# Patient Record
Sex: MALE | Race: WHITE | Employment: FULL TIME | URBAN - METROPOLITAN AREA
[De-identification: names, ages, dates, MRNs, and addresses within clinical notes are randomized per-mention and may not be internally consistent; named-entity substitution may affect disease eponyms.]

---

## 2017-10-14 NOTE — ED NOTES
[place], drink, cup of water, warm blanket, and TV remote given to pt per request. Pt is calm and cooperative. Will continue to monitor pt.

## 2017-10-14 NOTE — ED INITIAL ASSESSMENT (HPI)
32YM c/c of rad DE JESUS Pt state he was recently released from [address] [facility] state he was told be his doc to come in for increased in thought of wanting to harm himself.  Pt state he has a plan to hang himself again
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
## 2017-10-14 NOTE — ED PROVIDER NOTES
Patient Seen in: [facility] Emergency Department    History   Patient presents with:  Eval-P (psychiatric)    Stated Complaint: eval p suicidal just discharged from psych facility    HPI    Patient is a 57-year-old male who presents for evaluation o pallor  Neuro: Awake oriented ×3. Flat affect.   Nonfocal.  Good strength throughout    ED Course     Labs Reviewed   CBC W/ DIFFERENTIAL - Abnormal; Notable for the following:        Result Value    HGB 17.7 (*)     RDW-SD 48.9 (*)     Monocyte Absolute 0

## 2018-06-06 ENCOUNTER — HOSPITAL ENCOUNTER (EMERGENCY)
Facility: HOSPITAL | Age: 33
Discharge: HOME | End: 2018-06-06
Attending: EMERGENCY MEDICINE
Payer: MEDICAID

## 2018-06-06 ENCOUNTER — APPOINTMENT (OUTPATIENT)
Dept: CT IMAGING | Facility: HOSPITAL | Age: 33
End: 2018-06-06
Attending: EMERGENCY MEDICINE
Payer: MEDICAID

## 2018-06-06 ENCOUNTER — APPOINTMENT (OUTPATIENT)
Dept: GENERAL RADIOLOGY | Facility: HOSPITAL | Age: 33
End: 2018-06-06
Payer: MEDICAID

## 2018-06-06 VITALS
HEART RATE: 72 BPM | OXYGEN SATURATION: 100 % | TEMPERATURE: 98 F | HEIGHT: 74 IN | RESPIRATION RATE: 16 BRPM | BODY MASS INDEX: 37.6 KG/M2 | WEIGHT: 293 LBS | DIASTOLIC BLOOD PRESSURE: 80 MMHG | SYSTOLIC BLOOD PRESSURE: 126 MMHG

## 2018-06-06 DIAGNOSIS — R74.8 ELEVATED LIVER ENZYMES: ICD-10-CM

## 2018-06-06 DIAGNOSIS — R07.9 CHEST PAIN OF UNCERTAIN ETIOLOGY: Primary | ICD-10-CM

## 2018-06-06 PROCEDURE — 71045 X-RAY EXAM CHEST 1 VIEW: CPT | Performed by: EMERGENCY MEDICINE

## 2018-06-06 PROCEDURE — 93010 ELECTROCARDIOGRAM REPORT: CPT

## 2018-06-06 PROCEDURE — 96374 THER/PROPH/DIAG INJ IV PUSH: CPT

## 2018-06-06 PROCEDURE — 84484 ASSAY OF TROPONIN QUANT: CPT | Performed by: EMERGENCY MEDICINE

## 2018-06-06 PROCEDURE — 85025 COMPLETE CBC W/AUTO DIFF WBC: CPT

## 2018-06-06 PROCEDURE — 71275 CT ANGIOGRAPHY CHEST: CPT | Performed by: EMERGENCY MEDICINE

## 2018-06-06 PROCEDURE — 83690 ASSAY OF LIPASE: CPT | Performed by: HOSPITALIST

## 2018-06-06 PROCEDURE — 99285 EMERGENCY DEPT VISIT HI MDM: CPT

## 2018-06-06 PROCEDURE — 84484 ASSAY OF TROPONIN QUANT: CPT

## 2018-06-06 PROCEDURE — 80053 COMPREHEN METABOLIC PANEL: CPT

## 2018-06-06 PROCEDURE — 93005 ELECTROCARDIOGRAM TRACING: CPT

## 2018-06-06 PROCEDURE — 80053 COMPREHEN METABOLIC PANEL: CPT | Performed by: EMERGENCY MEDICINE

## 2018-06-06 PROCEDURE — 85025 COMPLETE CBC W/AUTO DIFF WBC: CPT | Performed by: EMERGENCY MEDICINE

## 2018-06-06 RX ORDER — ARIPIPRAZOLE 5 MG/1
5 TABLET ORAL DAILY
COMMUNITY

## 2018-06-06 RX ORDER — LORAZEPAM 2 MG/ML
0.5 INJECTION INTRAMUSCULAR ONCE
Status: COMPLETED | OUTPATIENT
Start: 2018-06-06 | End: 2018-06-06

## 2018-06-06 NOTE — ED INITIAL ASSESSMENT (HPI)
Pt to ED with c/o CP x couple days. Sts pain increasing with exertion. Pt sts increasing SOB over last couple days. Denies recent travel.

## 2018-06-06 NOTE — ED PROVIDER NOTES
Patient Seen in: [facility] Emergency Department    History   Patient presents with:  Chest Pain Angina (cardiovascular)    Stated Complaint: CP    HPI    50-year-old male, here for evaluation of chest pain.   He has been having a chest tightness marshall oriented to person, place, and time. Appears well-developed and well-nourished. He is obese  Head: Normocephalic and atraumatic. Nose: Nose normal.   Eyes: EOM are normal. Pupils are equal, round, and reactive to light. Neck: Normal range of motion.  Juana Cage axes as noted on EKG Report. Rate: 83  Rhythm: Sinus Rhythm  Reading: No significant changes from EKG done earlier in the day.                  ED Course as of Jun 06 1707  ------------------------------------------------------------      [facility]       Summary enzymes    Disposition:  Discharge  6/6/2018  5:06 pm    Follow-up:  Tj Moser MD  [address] [facility] [address] [place] [address]    In 1 week      Sherrie Perez MD  [address] [place], [place] [id number]

## 2018-06-06 NOTE — ED NOTES
Round on pt from CT. No distress noted. Pt requests anxiety med. MD updated. No distress noted.  Lights dimmed in room

## 2018-06-06 NOTE — ED NOTES
Round on pt. No distress noted. Family at bedside. Pt denies any needs at this time.  Updated on eta to CT

## (undated) NOTE — ED AVS SNAPSHOT
Violet Valdivia   MRN: [id number]    Department:  [facility] Emergency Department   Date of Visit:  10/13/2017           Disclosure     Insurance plans vary and the physician(s) referred by the ER may not be covered by your plan.  Please contact If you have been prescribed any medication(s), please fill your prescription right away and begin taking the medication(s) as directed    If the emergency physician has read X-rays, these will be re-interpreted by a radiologist.  If there is a significant

## (undated) NOTE — ED AVS SNAPSHOT
Johanna Marie   MRN: [id number]    Department:  [facility] Emergency Department   Date of Visit:  6/6/2018           Disclosure     Insurance plans vary and the physician(s) referred by the ER may not be covered by your plan.  Please contact yo tell this physician (or your personal doctor if your instructions are to return to your personal doctor) about any new or lasting problems. The primary care or specialist physician will see patients referred from the [facility] Emergency Department.  Katlyn Valenzuela